# Patient Record
Sex: FEMALE | Race: WHITE
[De-identification: names, ages, dates, MRNs, and addresses within clinical notes are randomized per-mention and may not be internally consistent; named-entity substitution may affect disease eponyms.]

---

## 2018-06-18 NOTE — HP
PREOPERATIVE HISTORY AND PHYSICAL:

 

DATE OF PREOPERATIVE HISTORY AND PHYSICAL EXAMINATION:  Wednesday, 06/13/18.

 

This patient is scheduled for a same-day surgery admission by Dr. Berger on 
Wednesday, 06/27/18.

 

ATTENDING SURGEON:  Dr. Surjit Berger * (dictated by Natalia Nguyen NP).

 

CHIEF COMPLAINT:  Gallstones.

 

HISTORY OF PRESENT ILLNESS:  The patient is a 73-year-old female recently 
evaluated by Dr. Berger for gallbladder disease.  She described the onset of 
severe upper and mid abdominal pain about 3 weeks ago.  It was accompanied with 
nausea and vomiting with some relief.  She also describes chills and a 
temperature of 99.4 at that time.  She initially thought she might be lactose 
intolerant since she had ice cream prior to the onset of symptoms.  She 
describes having three similar episodes over the course of the last year, but 
the most recent one being the worst.  The pain was nonradiating.  She denied 
any change in the color of urine or stool.  She presented to her primary care 
provider and underwent ultrasound that was consistent with gallstones and mild 
thickening of the gallbladder.  Dr. Berger has examined the patient and 
discussed the above findings with her and has recommended laparoscopic 
cholecystectomy as a same day surgery procedure; he described the nature of the 
surgical procedure, the rationale for the procedure, the relevant risks and 
benefits, and today I reviewed the typical postoperative care and recovery.  
The patient has had a chance to ask questions and stated that she understands 
the information and is satisfied with the answers given to her questions.  She 
will sign surgical consent on the day of surgery.

 

PAST MEDICAL HISTORY:  Osteoarthritis, specifically in her hips; migraine 
headaches; and varicose veins.

 

PAST SURGICAL HISTORY:  Tubal ligation.

 

MEDICATIONS:  Multivitamin 1 tablet by mouth daily.

 

ALLERGIES:  No known drug allergies.

 

FAMILY HISTORY:  The patient's mother had a possible allergy to some type of 
anesthesia, but she does not know the details.  No family history of deep vein 
thrombosis or pulmonary embolism.  No bleeding tendencies.

 

SOCIAL HISTORY:  She is ; she is a nonsmoker.  She denies the use of 
alcohol or other substances, and she exercises routinely.

 

REVIEW OF SYSTEMS:  Constitutional:  No recent fevers or chills.  No excessive 
fatigue or weight loss.  Endocrine:  No diabetes or thyroid disease.  
Hematologic: No easy bruising or bleeding.  No history of blood transfusions.  
Respiratory:  No dyspnea on exertion, no chronic cough.  Cardiovascular:  No 
anginal chest pain or palpitations.  Gastrointestinal:  Nausea and vomiting 
with most recent episode of abdominal pain; no diarrhea or constipation or GI 
bleeding.  No change in the color of stool.  Genitourinary:  No dysuria, no 
dark colored urine.  Musculoskeletal: Occasional bilateral hip pain after a 
long walk.  Integumentary:  No chronic rashes or skin changes.  Neurologic:  No 
headache or blurred vision or areas of focal weakness.  Normal gait.  General:  
No history of deep vein thrombosis of pulmonary embolism; she states that after 
her tubal ligation in the recovery room she was awake, but she felt paralyzed 
and claustrophobic.

 

                               PHYSICAL EXAMINATION

 

GENERAL SURVEY:  The patient is a 73-year-old female, well developed, well 
nourished, in no acute distress.

 

VITAL SIGNS:  Height 54 inches, weight 123 pounds, body mass index 21.

 

SKIN:  Warm, dry, and intact.

 

HEENT:  Benign.

 

NECK:  Supple.  No cervical lymphadenopathy.  No supraclavicular 
lymphadenopathy.

 

BACK:  No CVA tenderness.

 

LUNGS:  Breath sounds bilaterally clear and equal.

 

HEART:  Regular rate and rhythm.  No murmurs or rubs appreciated.

 

ABDOMEN:  Active bowel sounds, soft, nondistended, nontender throughout.  
Negative Mccloud sign.  No obvious masses, organomegaly, or evidence of ventral 
hernia.

 

EXTREMITIES:  Are warm without edema or skin ulceration.

 

PELVIC AND RECTAL:  Exam is deferred.  She is up-to-date with pelvic exam.

 

NEUROLOGIC:  Alert and oriented x3, steady gait.

 

 IMPRESSION:  Calculus of gallbladder with chronic cholecystitis without 
obstruction.

 

PLAN/RECOMMENDATIONS:  Same-day surgery admission to Dr. Berger's service for 
laparoscopic cholecystectomy on Wednesday, 06/27/18.

 

 ____________________________________ ROBERTO NGUYEN NP

 

[id number]/CPS #: [id number]

MARIANN

## 2018-06-27 ENCOUNTER — HOSPITAL ENCOUNTER (OUTPATIENT)
Dept: HOSPITAL 25 - OR | Age: 73
Discharge: HOME | End: 2018-06-27
Attending: SURGERY
Payer: MEDICARE

## 2018-06-27 VITALS — DIASTOLIC BLOOD PRESSURE: 74 MMHG | SYSTOLIC BLOOD PRESSURE: 145 MMHG

## 2018-06-27 DIAGNOSIS — I83.90: ICD-10-CM

## 2018-06-27 DIAGNOSIS — M19.90: ICD-10-CM

## 2018-06-27 DIAGNOSIS — G43.909: ICD-10-CM

## 2018-06-27 DIAGNOSIS — K80.10: Primary | ICD-10-CM

## 2018-06-27 PROCEDURE — 88304 TISSUE EXAM BY PATHOLOGIST: CPT

## 2018-06-27 NOTE — OP
CC:  Dr. Thomas Gao *

 

DATE OF OPERATION:  06/27/18 - [facility]

 

DATE OF BIRTH:  [birth year]

 

SURGEON:  Surjit Berger MD

 

ASSISTANT:  Natalia Nguyen NP

 

ANESTHESIOLOGIST:  Ar yBrnes MD

 

ANESTHESIA:  General anesthesia.

 

PRE-OP DIAGNOSIS:  Chronic cholecystitis.

 

POST-OP DIAGNOSIS:  Chronic cholecystitis.

 

OPERATIVE PROCEDURE:  Laparoscopic cholecystectomy.

 

ESTIMATED BLOOD LOSS:  Minimal.

 

FLUIDS:  Minimal crystalloid fluid given.

 

SPECIMEN:  Gallbladder.

 

DRAINS:  None.

 

DESCRIPTION OF PROCEDURE:  The patient was identified in the preoperative area, 
brought to the OR, and placed on the operating table in the supine position. 
Preoperative antibiotics were given.  Sequential devices were placed on 
bilateral lower extremities.  General anesthesia was induced.  The patient's 
abdomen was prepped and draped in a standard surgical fashion.  A time-out was 
performed.

 

An infraumbilical incision was made.  The skin was then elevated and a Veress 
needle inserted into the abdominal cavity, which was then allowed to insufflate 
to a pressure of 15 mmHg.  The patient tolerated the insufflation well.  The 
Veress needle remained in the umbilicus and a 5-mm trocar was inserted 
infraumbilically. A laparoscope was inserted through this and there was no 
evidence of injury from the trocar insertion or from the Veress needle, which 
was hung upon a portion of omentum, but then brought out easily.  There was no 
evidence of bleeding.  There were no enteric contents.

 

Next, additional trocars were placed, a 12-mm in the subxiphoid area and two 5 
mm along the right costal margin.

 

Table was repositioned.  Gallbladder was identified.  The fundus was grasped 
and elevated above the liver.  The infundibulum was retracted towards the right 
lower quadrant.  This was exposed Calot's triangle nicely.  Dissection was 
carried out on the lateral aspect with electrocautery and also on the medial 
aspect.  The  cystic duct and cystic artery were isolated.  They were doubly 
clipped and ligated, and the gallbladder was removed from the liver bed, placed 
in an endoscopic retrieval bag.

 

A 4x8 gauze was then paced at the liver bed.  There was some bleeding along the 
peritoneal incision site.  This was cauterized.  Hemostasis was achieved.  The 
4x8 gauze was then removed, and review of the cystic duct stump and cystic 
artery stump showed no bleeding or bile leak.  The liver bed was dry without 
evidence of bleeding.

 

Next, the table was repositioned back in neutral.  The gallbladder was removed 
with an endoscopic retrieval bag through the subxiphoid port and passed it off 
as specimen.  We then allowed the abdomen to collapse and trocars were removed 
under direct vision and all 4 skin incisions were reapproximated with 4-0 
Monocryl subcuticular sutures followed by Steri-Strips and sterile dressing.

 

 [id number]/[facility] #: [id number]

[facility]

## 2018-06-27 NOTE — BRIEFOPN
Brief Operative Note





- Surgery


Procedures: 


Procedures








Pre-OP Diagnoses:   chronic cholecystitis





Post-op Diagnosis:   same





Procedure:      Laparoscopic cholecystectomy





Surgeon:       Celine





Asst:       Patrick Zaldivarthesia:       CASH  





EBL:      minimal





IVF:      minimal





Specimen:      gallbladder





Drains:      none

## 2018-12-12 ENCOUNTER — HOSPITAL ENCOUNTER (EMERGENCY)
Dept: HOSPITAL 25 - ED | Age: 73
Discharge: HOME | End: 2018-12-12
Payer: MEDICARE

## 2018-12-12 VITALS — SYSTOLIC BLOOD PRESSURE: 129 MMHG | DIASTOLIC BLOOD PRESSURE: 71 MMHG
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
## 2018-12-12 DIAGNOSIS — S42.215A: Primary | ICD-10-CM
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
## 2018-12-12 PROCEDURE — 99282 EMERGENCY DEPT VISIT SF MDM: CPT

## 2018-12-12 NOTE — ED
Upper Extremity Pain





- HPI Summary


HPI Summary: 


Patient is a 73-year-old female presents to the ED with arm injury.  She states 

she was walking her dog this afternoon when she fell on the sidewalk, landing 

directly onto her left arm.  She denies falling on an outstretched arm.  She 

states she fell directly onto the humerus.  She is endorsing pain up into the 

shoulder joint without pain to the clavicle or forearm.  Denies any pain to the 

hand.  Denies any numbness or tingling, color or temperature changes.  She 

remains in internal rotation and is unable to externally rotate.  She has never 

injured this arm before.  She is otherwise healthy and takes no blood thinners.








- History of Current Complaint


Chief Complaint: EDExtremityUpper


Stated Complaint: POSS BREAK IN RT ARM


Time Seen by Provider: 12/12/18 15:42


Hx Obtained From: Patient


Hx Last Menstrual Period: TUBAL LIGATION


Mechanism Of Injury: Blunt Trauma


Onset/Duration: Started Hours Ago


Timing: Constant


Severity Currently: Moderate


Pain Location: Arm


Character: Aching


Alleviating Factor(s): Nothing


Associated Signs & Symptoms: Positive: Negative


Related History: Dominant Hand Right





- Risk Factors


Non-Orthopedic Risk Factor: Negative


DVT Risk Factors: Negative


Septic Arthritis Risk Factor: Negative


Compartment Syndrome Risk Factors: Pain





- Allergies/Home Medications


Allergies/Adverse Reactions: 


 Allergies











Allergy/AdvReac Type Severity Reaction Status Date / Time


 


No Known Allergies Allergy   Verified 06/27/18 06:22














PMH/Surg Hx/FS Hx/Imm Hx


Previously Healthy: Yes


Cardiovascular History: Reports: Other Cardiovascular Problems/Disorders - 

reports bilateral legs varicose vein issue


Respiratory History: Reports: Other Respiratory Problems/Disorders - occas 

sinus issues


GI History: Reports: Other GI Disorders - current chronic cholecystitis


Musculoskeletal History: Reports: Hx Arthritis - OA in hips


Sensory History: Reports: Hx Contacts or Glasses - glasses


Opthamlomology History: Reports: Hx Contacts or Glasses - glasses


Neurological History: Reports: Hx Migraine - none in many years





- Cancer History


Hx Chemotherapy: No


Hx Radiation Therapy: No





- Surgical History


Surgery Procedure, Year, and Place: tubal ligation 1983 - [place], [place]


Hx Anesthesia Reactions: No





- Immunization History


Hx Pertussis Vaccination: No


Immunizations Up to Date: Yes


Infectious Disease History: No


Infectious Disease History: 


   Denies: Hx Clostridium Difficile, Hx Hepatitis, Hx Human Immunodeficiency 

Virus (HIV), Hx of Known/Suspected MRSA, Hx Shingles, Hx Tuberculosis, Hx Known/

Suspected VRE, Hx Known/Suspected VRSA, History Other Infectious Disease, 

Traveled Outside the [place] in Last 30 Days





- Family History


Known Family History: Positive: None





- Social History


Occupation: Unemployed


Lives: With Family


Alcohol Use: None


Hx Substance Use: No


Substance Use Type: Reports: None


Hx Tobacco Use: No


Smoking Status (MU): Never Smoked Tobacco





Review of Systems


Negative: Fever, Chills, Fatigue, Skin Diaphoresis


Negative: Palpitations, Chest Pain


Negative: Shortness Of Breath, Cough


Genitourinary: Negative


Positive: no symptoms reported, see HPI


Positive: Arthralgia, Myalgia


Negative: Rash, Bruising


Negative: Paresthesia, Numbness


Psychological: Normal


All Other Systems Reviewed And Are Negative: Yes





Physical Exam


Triage Information Reviewed: Yes


Vital Signs On Initial Exam: 


 Initial Vitals











Temp Pulse Resp BP Pulse Ox


 


 99.1 F   67   16   124/89   100 


 


 12/12/18 15:42  12/12/18 15:42  12/12/18 15:42  12/12/18 15:42  12/12/18 15:42











Vital Signs Reviewed: Yes


Appearance: Positive: Well-Appearing, Well-Nourished


Skin: Positive: Skin Color Reflects Adequate Perfusion


Head/Face: Positive: Normal Head/Face Inspection


Eyes: Positive: EOMI, Conjunctiva Clear


Neck: Positive: Supple, Nontender, No Lymphadenopathy


Respiratory/Lung Sounds: Positive: Clear to Auscultation, Breath Sounds Present


Cardiovascular: Positive: RRR, Pulses are Symmetrical in both Upper and Lower 

Extremities


Musculoskeletal: Positive: Pain @ - left arm injury


Neurological: Positive: Speech Normal


Psychiatric: Positive: Affect/Mood Appropriate





Diagnostics





- Vital Signs


 Vital Signs











  Temp Pulse Resp BP Pulse Ox


 


 12/12/18 15:42  99.1 F  67  16  124/89  100














- Laboratory


Lab Statement: Any lab studies that have been ordered have been reviewed, and 

results considered in the medical decision making process.





Course/Dx





- Course


Course Of Treatment: During the course of treatment, the patient is evaluated 

for left arm injury.  On physical examination, she is neurovascularly intact.  X

-ray shows :  IMPRESSION: COMMINUTED FRACTURE OF THE PROXIMAL HUMERUS.  Sling 

is given.  She declines pain medications at this time.  She will follow-up with 

orthopedics next week.  She denies any other concerns at this time.  She is 

given Tylenol while in the ED.





- Diagnoses


Differential Diagnosis/HQI/PQRI: Positive: Fracture (Closed)


Provider Diagnoses: 


 Humerus fracture








Discharge





- Sign-Out/Discharge


Documenting (check all that apply): Patient Departure





- Discharge Plan


Condition: Stable


Disposition: HOME


Patient Education Materials:  Arm Fracture in Adults (ED)


Referrals: 


Thomas Gao MD [Primary Care Provider] - 


Anjel Kang MD [Medical Doctor] - 


Additional Instructions: 


Please follow up with Dr. Kang this week or early next week


Keep the arm in a sling at all times


You may take out to shower/bath, but you may not move the arm


Tylenol 650mg three times daily for discomfort








- Billing Disposition and Condition


Condition: STABLE


Disposition: Home

## 2020-01-07 ENCOUNTER — HOSPITAL ENCOUNTER (OUTPATIENT)
Dept: HOSPITAL 25 - OREAST | Age: 75
Discharge: HOME | End: 2020-01-07
Attending: OPHTHALMOLOGY
Payer: MEDICARE

## 2020-01-07 VITALS — DIASTOLIC BLOOD PRESSURE: 69 MMHG | SYSTOLIC BLOOD PRESSURE: 119 MMHG

## 2020-01-07 DIAGNOSIS — M19.90: ICD-10-CM

## 2020-01-07 DIAGNOSIS — H25.12: Primary | ICD-10-CM

## 2020-01-07 PROCEDURE — V2632 POST CHMBR INTRAOCULAR LENS: HCPCS

## 2020-01-07 NOTE — OP
DATE OF OPERATION:  01/07/20 [facility]

 

DATE OF BIRTH:  [birth year]

 

SURGEON:  Dr. Shine Andrade

 

ASSISTANT:  None.

 

ANESTHESIA:  Topical with intravenous sedation.

 

PRE-OP DIAGNOSIS:  Cataract, left eye.

 

POST-OP DIAGNOSIS:  Cataract, left eye.

 

OPERATIVE PROCEDURE: Phacoemulsification and cataract extraction with posterior 
chamber intraocular lens implant, left eye.

 

COMPLICATIONS:  None.

 

BLOOD LOSS:  None.

 

DESCRIPTION OF PROCEDURE:  The patient was brought to the operating room and 
received a small amount of intravenous sedation.  A drop of Tetracaine was 
placed in her eft eye.  She was prepped and draped in the usual sterile fashion 
for ophthalmic surgery and attention was directed to the left eye where a 
speculum was placed.  A paracentesis was created at the 5 o'clock position and 
0.1 cc of 1 percent preservative-free Lidocaine was injected into the anterior 
chamber followed by DisCoVisc.  The eye was digitally stabilized while a 2.75 
mm keratome was used to create a triplanar clear corneal incision at the 3 o'
clock position.  A continuous curvilinear capsulorrhexis was created with a 
cystotome and Utrata forceps.  BSS on a cannula was used to hydrodissect the 
lens from the capsule.  Phacoemulsification was performed in a divide-and-
conquer technique to create four fragments which were removed.  Residual 
cortical material was removed with irrigation and aspiration. DisCoVisc was 
used to inflate the capsular bag and an AU00T0 21.5 diopter lens was folded and 
inserted into the capsular bag.  DisCoVisc was removed using irrigation and 
aspiration.  BSS on a cannula was used to hydrate the corneal stroma and seal 
the wound.  At the end of the case the pupil was round and the lens was 
centered. The eye was of normal pressure and the wound was water tight.  The 
speculum was removed and topical Maxitrol ointment was placed on the surface of 
the eye. The eye was closed, patched and shielded and the patient was sent to 
the recovery room in stable condition with post operative instructions and 
follow-up appointment given.

 

 [id number]/[facility] #: [id number]

MARIANN

## 2020-01-14 ENCOUNTER — HOSPITAL ENCOUNTER (OUTPATIENT)
Dept: HOSPITAL 25 - OREAST | Age: 75
Discharge: HOME | End: 2020-01-14
Attending: OPHTHALMOLOGY
Payer: MEDICARE

## 2020-01-14 VITALS — DIASTOLIC BLOOD PRESSURE: 68 MMHG | SYSTOLIC BLOOD PRESSURE: 119 MMHG

## 2020-01-14 DIAGNOSIS — H25.11: Primary | ICD-10-CM

## 2020-01-14 PROCEDURE — V2632 POST CHMBR INTRAOCULAR LENS: HCPCS

## 2020-01-14 NOTE — OP
DATE OF OPERATION/DATE OF DICTATION:  01/14/2020 - [facility]

 

YOB: 1945.

 

SURGEON:  Dr. Shine Andrade.

 

ASSISTANT:  None.

 

ANESTHESIA:  Topical with intravenous sedation.

 

PRE-OP DIAGNOSIS:  Cataract, right eye.

 

POST-OP DIAGNOSIS:  Cataract, right eye.

 

OPERATIVE PROCEDURE: Phacoemulsification and cataract extraction with posterior 
chamber intraocular lens implant, right eye.

 

COMPLICATIONS:  None.

 

BLOOD LOSS:  None.

 

DESCRIPTION OF PROCEDURE:  The patient was brought to the operating room and 
received a small amount of intravenous sedation.  A drop of Tetracaine was 
placed in her right eye.  She was prepped and draped in the usual sterile 
fashion for ophthalmic surgery and attention was directed to the right eye 
where a speculum was placed.  A paracentesis was created at the 11 o'clock 
position and 0.1 cc of 1 percent preservative-free Lidocaine was injected into 
the anterior chamber followed by DisCoVisc.  The eye was digitally stabilized 
while a 2.75 mm keratome was used to create a triplanar clear corneal incision 
at the 9 o'clock position.  A continuous curvilinear capsulorrhexis was created 
with a cystotome and Utrata forceps.  BSS on a cannula was used to hydrodissect 
the lens from the capsule.  Phacoemulsification was performed in a divide-and-
conquer technique to create four fragments which were removed.  Residual 
cortical material was removed with irrigation and aspiration. DisCoVisc was 
used to inflate the capsular bag and an AUOOTO 22.0 diopter lens was folded and 
inserted into the capsular bag.  DisCoVisc was removed using irrigation and 
aspiration.  BSS on a cannula was used to hydrate the corneal stroma and seal 
the wound.  At the end of the case the pupil was round and the lens was 
centered. The eye was of normal pressure and the wound was water tight.  The 
speculum was removed and topical Maxitrol ointment was placed on the surface of 
the eye. The eye was closed, patched and shielded and the patient was sent to 
the recovery room in stable condition with post operative instructions and 
follow-up appointment given.

 

 [id number]/[facility] #: [id number]

MTDD